# Patient Record
Sex: MALE | Race: WHITE
[De-identification: names, ages, dates, MRNs, and addresses within clinical notes are randomized per-mention and may not be internally consistent; named-entity substitution may affect disease eponyms.]

---

## 2019-10-19 ENCOUNTER — HOSPITAL ENCOUNTER (EMERGENCY)
Dept: HOSPITAL 41 - JD.ED | Age: 34
LOS: 1 days | Discharge: HOME | End: 2019-10-20
Payer: SELF-PAY

## 2019-10-19 DIAGNOSIS — L02.31: Primary | ICD-10-CM

## 2019-10-19 DIAGNOSIS — L03.317: ICD-10-CM

## 2019-10-19 NOTE — EDM.PDOC
ED HPI GENERAL MEDICAL PROBLEM





- General


Chief Complaint: Skin Complaint


Stated Complaint: LUMP ON BUTTOCKS AND FEVER


Time Seen by Provider: 10/19/19 22:07





- History of Present Illness


INITIAL COMMENTS - FREE TEXT/NARRATIVE: 





33-year-old male presents to the emergency room with a lump developing on his 

left Buttocks. He also thought maybe he was getting some fevers.





The patient injects himself every other day with testosterone. A recently 

injected in the site. He is starting to notice some discomfort in this area. 

Other than this is getting along with his activities of daily living without 

any difficulty.. He does not have a local physician.








  ** Left Buttock


Pain Score (Numeric/FACES): 7





- Related Data


 Allergies











Allergy/AdvReac Type Severity Reaction Status Date / Time


 


No Known Allergies Allergy   Verified 10/19/19 22:05











Home Meds: 


 Home Meds





. [No Known Home Meds]  10/19/19 [History]











ED ROS GENERAL





- Review of Systems


Review Of Systems: See Below


Constitutional: Reports: Fever (Possible).  Denies: Chills


Respiratory: Reports: No Symptoms


Cardiovascular: Reports: No Symptoms


GI/Abdominal: Reports: No Symptoms


Skin: Reports: Other (See history of present illness)





ED EXAM, SKIN/RASH


Exam: See Below


Exam Limited By: No Limitations


General Appearance: Alert, No Apparent Distress


Head: Atraumatic, Normocephalic


Neck: Normal Inspection, Supple, Non-Tender, Full Range of Motion


Respiratory/Chest: No Respiratory Distress, Lungs Clear, Normal Breath Sounds, 

No Accessory Muscle Use, Chest Non-Tender


Cardiovascular: Normal Peripheral Pulses, Regular Rate, Rhythm, No Edema, No 

Gallop, No JVD, No Murmur, No Rub


GI/Abdominal: Normal Bowel Sounds, Soft, Non-Tender


Back Exam: Other (Semination left gluteus shows some erythema minimal warmth 

minimal swelling. There is certainly nothing to drain at this point)





Course





- Vital Signs


Last Recorded V/S: 


 Last Vital Signs











Temp  36.8 C   10/19/19 22:02


 


Pulse  89   10/19/19 22:02


 


Resp  18   10/19/19 22:02


 


BP  148/75 H  10/19/19 22:02


 


Pulse Ox  98   10/19/19 22:02














- Orders/Labs/Meds


Labs: 


 Laboratory Tests











  10/19/19 10/19/19 Range/Units





  22:30 22:30 


 


WBC  13.46 H   (4.23-9.07)  K/mm3


 


RBC  5.00   (4.63-6.08)  M/mm3


 


Hgb  15.4   (13.7-17.5)  gm/dl


 


Hct  46.9   (40.1-51.0)  %


 


MCV  93.8 H   (79.0-92.2)  fl


 


MCH  30.8   (25.7-32.2)  pg


 


MCHC  32.8   (32.2-35.5)  g/dl


 


RDW Std Deviation  43.1   (35.1-43.9)  fL


 


Plt Count  252   (163-337)  K/mm3


 


MPV  9.7   (9.4-12.3)  fl


 


Neutrophils % (Manual)  75 H   (40-60)  %


 


Band Neutrophils %  1   (0-10)  %


 


Lymphocytes % (Manual)  20   (20-40)  %


 


Atypical Lymphs %  0   %


 


Monocytes % (Manual)  1 L   (2-10)  %


 


Eosinophils % (Manual)  2   (0.8-7.0)  %


 


Basophils % (Manual)  1   (0.2-1.2)  


 


Platelet Estimate  Adequate   


 


RBC Morph Comment  Normal   


 


C-Reactive Protein   1.5 H*  (<1.0)  mg/dL














- Re-Assessments/Exams


Free Text/Narrative Re-Assessment/Exam: 





10/19/19 23:51


Minimally elevated white count and minimally elevated C-reactive protein. 

Discussed the case with Dr. Rouse will follow up with the patient early this 

next week.





Departure





- Departure


Time of Disposition: 23:51


Disposition: Home, Self-Care 01


Clinical Impression: 


 Abscess, Cellulitis








- Discharge Information


Referrals: 


PCP,None [Primary Care Provider] - 


Bladimir Rouse MD [Physician] - 


Forms:  ED Department Discharge


Additional Instructions: 


Return to the emergency room with any questions problems worsening symptoms. 





Follow-up with Dr. Rouse on Monday or Tuesday. 





In some warm Epsom salts solution soak your bottom for 30 minutes 2-3 times a 

day. From the machine in the waiting room you have been given a prescription 

for clindamycin 300 mg #40 take 1  4 times a day until all gone

## 2020-08-23 ENCOUNTER — HOSPITAL ENCOUNTER (EMERGENCY)
Dept: HOSPITAL 41 - JD.ED | Age: 35
LOS: 1 days | Discharge: HOME | End: 2020-08-24
Payer: SELF-PAY

## 2020-08-23 DIAGNOSIS — I10: ICD-10-CM

## 2020-08-23 DIAGNOSIS — R00.2: ICD-10-CM

## 2020-08-23 DIAGNOSIS — R07.89: Primary | ICD-10-CM

## 2020-08-23 DIAGNOSIS — Z79.899: ICD-10-CM

## 2020-08-23 PROCEDURE — 80053 COMPREHEN METABOLIC PANEL: CPT

## 2020-08-23 PROCEDURE — 96375 TX/PRO/DX INJ NEW DRUG ADDON: CPT

## 2020-08-23 PROCEDURE — 96374 THER/PROPH/DIAG INJ IV PUSH: CPT

## 2020-08-23 PROCEDURE — 85025 COMPLETE CBC W/AUTO DIFF WBC: CPT

## 2020-08-23 PROCEDURE — 99285 EMERGENCY DEPT VISIT HI MDM: CPT

## 2020-08-23 PROCEDURE — 36415 COLL VENOUS BLD VENIPUNCTURE: CPT

## 2020-08-23 PROCEDURE — 93005 ELECTROCARDIOGRAM TRACING: CPT

## 2020-08-23 PROCEDURE — 84484 ASSAY OF TROPONIN QUANT: CPT

## 2020-08-23 NOTE — EDM.PDOC
ED HPI GENERAL MEDICAL PROBLEM





- General


Chief Complaint: Chest Pain


Stated Complaint: CHEST PAIN/HIGH BP/SHAKING


Time Seen by Provider: 08/23/20 23:02


Source of Information: Reports: Patient, RN Notes Reviewed





- History of Present Illness


INITIAL COMMENTS - FREE TEXT/NARRATIVE: 





34 yr old male with palpiations, dizziness, Htn at home, now feeling somewhat 

better but still having palpitations on arrival to ED.  No Gonzalez, chest pain at 

time of eval.  No cough, dyspnea, fever or chills.  No abd pain, nausea or 

vomiting.  Hx of borderline Htn. 





- Related Data


                                    Allergies











Allergy/AdvReac Type Severity Reaction Status Date / Time


 


No Known Allergies Allergy   Verified 08/23/20 22:15











Home Meds: 


                                    Home Meds





lisinopriL [Lisinopril] 10 mg PO DAILY #30 tablet 08/24/20 [Rx]











Past Medical History


Endocrine/Metabolic History: Reports: Other (See Below)


Other Endocrine/Metabolic History: low testosterone


Hematologic History: Reports: Other (See Below)





- Infectious Disease History


Infectious Disease History: Reports: Chicken Pox





- Past Surgical History


HEENT Surgical History: Reports: Oral Surgery





Social & Family History





- Family History


Family Medical History: Noncontributory





- Tobacco Use


Smoking Status *Q: Never Smoker


Second Hand Smoke Exposure: No





- Caffeine Use


Caffeine Use: Reports: Coffee





- Recreational Drug Use


Recreational Drug Use: No





ED ROS GENERAL





- Review of Systems


Review Of Systems: See Below


Constitutional: Denies: Fever, Chills, Diaphoresis


HEENT: Reports: No Symptoms


Respiratory: Denies: Shortness of Breath, Cough


Cardiovascular: Reports: Lightheadedness, Palpitations.  Denies: Chest Pain


GI/Abdominal: Denies: Abdominal Pain, Nausea, Vomiting


Musculoskeletal: Denies: Shoulder Pain, Arm Pain, Back Pain


Skin: Denies: Rash


Neurological: Reports: Dizziness, Numbness (now gone).  Denies: Headache, 

Trouble Speaking, Difficulty Walking





ED EXAM, GENERAL





- Physical Exam


Exam: See Below


General Appearance: Alert, Anxious, Mild Distress


Eye Exam: Bilateral Eye: PERRL


Throat/Mouth: Normal Inspection, Normal Oropharynx


Head: Atraumatic


Neck: Supple


Respiratory/Chest: No Respiratory Distress, Lungs Clear, Normal Breath Sounds


Cardiovascular: Tachycardia


GI/Abdominal: Soft, Non-Tender


Extremities: Normal Inspection, Normal Range of Motion


Neurological: Alert, Oriented, No Motor/Sensory Deficits


Skin Exam: Warm, Dry, No Rash





EKG INTERPRETATION


EKG Date: 08/23/20


Rhythm: Other (sinus tachycardia)


Rate (Beats/Min): 106


Axis: Normal


P-Wave: Present


QRS: Normal


ST-T: Normal





Course





- Vital Signs


Last Recorded V/S: 


                                Last Vital Signs











Temp  97.6 F   08/23/20 22:12


 


Pulse  118 H  08/23/20 22:12


 


Resp  16   08/23/20 22:12


 


BP  160/96 H  08/24/20 00:38


 


Pulse Ox  97   08/23/20 22:12














- Orders/Labs/Meds


Orders: 


                               Active Orders 24 hr











 Category Date Time Status


 


 Peripheral IV Insertion Adult [OM.PC] Stat Oth  08/23/20 23:12 Ordered











Labs: 


                                Laboratory Tests











  08/23/20 08/23/20 Range/Units





  22:18 22:18 


 


WBC  9.46 H   (4.23-9.07)  K/mm3


 


RBC  5.48   (4.63-6.08)  M/mm3


 


Hgb  17.4  D   (13.7-17.5)  gm/dl


 


Hct  51.3 H   (40.1-51.0)  %


 


MCV  93.6 H   (79.0-92.2)  fl


 


MCH  31.8   (25.7-32.2)  pg


 


MCHC  33.9   (32.2-35.5)  g/dl


 


RDW Std Deviation  42.7   (35.1-43.9)  fL


 


Plt Count  302   (163-337)  K/mm3


 


MPV  10.4   (9.4-12.3)  fl


 


Neut % (Auto)  48.2   (34.0-67.9)  %


 


Lymph % (Auto)  39.9   (21.8-53.1)  %


 


Mono % (Auto)  9.6   (5.3-12.2)  %


 


Eos % (Auto)  1.2   (0.8-7.0)  


 


Baso % (Auto)  1.0   (0.1-1.2)  %


 


Neut # (Auto)  4.57   (1.78-5.38)  K/mm3


 


Lymph # (Auto)  3.77 H   (1.32-3.57)  K/mm3


 


Mono # (Auto)  0.91 H   (0.30-0.82)  K/mm3


 


Eos # (Auto)  0.11   (0.04-0.54)  K/mm3


 


Baso # (Auto)  0.09 H   (0.01-0.08)  K/mm3


 


Sodium   141  (136-145)  mEq/L


 


Potassium   3.4 L  (3.5-5.1)  mEq/L


 


Chloride   103  ()  mEq/L


 


Carbon Dioxide   27  (21-32)  mEq/L


 


Anion Gap   14.4  (5-15)  


 


BUN   20 H  (7-18)  mg/dL


 


Creatinine   1.2  (0.7-1.3)  mg/dL


 


Est Cr Clr Drug Dosing   91.25  mL/min


 


Estimated GFR (MDRD)   > 60  (>60)  mL/min


 


BUN/Creatinine Ratio   16.7  (14-18)  


 


Glucose   118 H  ()  mg/dL


 


Calcium   9.2  (8.5-10.1)  mg/dL


 


Total Bilirubin   0.3  (0.2-1.0)  mg/dL


 


AST   28  (15-37)  U/L


 


ALT   41  (16-63)  U/L


 


Alkaline Phosphatase   48  ()  U/L


 


Troponin I   < 0.017  (0.00-0.056)  ng/mL


 


Total Protein   8.0  (6.4-8.2)  g/dl


 


Albumin   4.5  (3.4-5.0)  g/dl


 


Globulin   3.5  gm/dL


 


Albumin/Globulin Ratio   1.3  (1-2)  











Meds: 


Medications














Discontinued Medications














Generic Name Dose Route Start Last Admin





  Trade Name Freq  PRN Reason Stop Dose Admin


 


Acetaminophen  975 mg  08/24/20 00:34  08/24/20 00:37





  Tylenol  PO  08/24/20 00:35  Not Given





  NOW ONE  


 


Al Hydroxide/Mg Hydroxide 30  0 ml  08/23/20 23:41 





  ml/ Lidocaine HCl 15 ml  PO  08/23/20 23:42 





  ONETIME ONE  


 


Al Hydroxide/Mg Hydroxide 30  0 ml  08/23/20 23:42  08/23/20 23:47





  ml/ Lidocaine HCl 15 ml  PO  08/23/20 23:43  45 ml





  ONETIME ONE   Administration


 


Labetalol HCl  20 mg  08/23/20 23:13  08/23/20 23:22





  Normodyne  IVPUSH  08/23/20 23:14  20 mg





  ONETIME ONE   Administration





  Protocol  


 


Lisinopril  10 mg  08/24/20 00:30  08/24/20 00:38





  Prinivil  PO   10 mg





  DAILY KADEEM   Administration


 


Lorazepam  1 mg  08/23/20 23:13  08/23/20 23:26





  Ativan  IVPUSH  08/23/20 23:14  1 mg





  ONETIME ONE   Administration


 


Sodium Chloride  10 ml  08/23/20 23:13  08/23/20 23:28





  Saline Flush  FLUSH   10 ml





  ASDIRECTED PRN   Administration





  Keep Vein Open  














- Re-Assessments/Exams


Free Text/Narrative Re-Assessment/Exam: 





08/24/20 02:17


BP and heart rate came down with labetalol 20 mg IV, ativan 1 mg IV,  will start

 on lisinopril 10 mg daily.  discharge instr. as documented. 





Departure





- Departure


Time of Disposition: 00:35


Disposition: Home, Self-Care 01


Condition: Fair


Clinical Impression: 


 Hypertension, Atypical chest pain, Palpitations





Prescriptions: 


lisinopriL [Lisinopril] 10 mg PO DAILY #30 tablet


Instructions:  Nonspecific Chest Pain, Adult, Hypertension, Adult


Referrals: 


PCP,None [Primary Care Provider] - 


Forms:  ED Department Discharge


Additional Instructions: 


Lisinopril 10 mg daily. You have been given your first dose here in the ED.  

Prescription has been sent to [facility] [place]. Continue to check BP

frequently.  See a medical provider in the next 1 to 2 weeks for a complete 

physical.  Return to ED as needed. 





Sepsis Event Note (ED)





- Evaluation


Sepsis Screening Result: No Definite Risk





- Focused Exam


Vital Signs: 


                                   Vital Signs











  Temp Pulse Resp BP Pulse Ox


 


 08/24/20 00:38     160/96 H 


 


 08/23/20 22:12  97.6 F  118 H  16   97














- My Orders


Last 24 Hours: 


My Active Orders





08/23/20 23:12


Peripheral IV Insertion Adult [OM.PC] Stat 














- Assessment/Plan


Last 24 Hours: 


My Active Orders





08/23/20 23:12


Peripheral IV Insertion Adult [OM.PC] Stat

## 2021-06-17 ENCOUNTER — HOSPITAL ENCOUNTER (EMERGENCY)
Dept: HOSPITAL 41 - JD.ED | Age: 36
LOS: 1 days | Discharge: HOME | End: 2021-06-18
Payer: COMMERCIAL

## 2021-06-17 DIAGNOSIS — Z20.822: ICD-10-CM

## 2021-06-17 DIAGNOSIS — E66.9: ICD-10-CM

## 2021-06-17 DIAGNOSIS — Z72.0: ICD-10-CM

## 2021-06-17 DIAGNOSIS — J06.9: Primary | ICD-10-CM

## 2021-06-17 PROCEDURE — 85007 BL SMEAR W/DIFF WBC COUNT: CPT

## 2021-06-17 PROCEDURE — 99283 EMERGENCY DEPT VISIT LOW MDM: CPT

## 2021-06-17 PROCEDURE — 36415 COLL VENOUS BLD VENIPUNCTURE: CPT

## 2021-06-17 PROCEDURE — 83735 ASSAY OF MAGNESIUM: CPT

## 2021-06-17 PROCEDURE — 85027 COMPLETE CBC AUTOMATED: CPT

## 2021-06-17 PROCEDURE — 0240U: CPT

## 2021-06-17 PROCEDURE — 71046 X-RAY EXAM CHEST 2 VIEWS: CPT

## 2021-06-17 PROCEDURE — 80053 COMPREHEN METABOLIC PANEL: CPT

## 2021-06-17 NOTE — EDM.PDOC
ED HPI GENERAL MEDICAL PROBLEM





- General


Chief Complaint: Respiratory Problem


Stated Complaint: COUGH AND FEVER


Time Seen by Provider: 06/17/21 23:01


Source of Information: Reports: Patient


History Limitations: Reports: No Limitations





- History of Present Illness


INITIAL COMMENTS - FREE TEXT/NARRATIVE: 





Mr. Carey is a very pleasant 35-year-old gentleman who now presents the ED 

stating that he developed a nonproductive cough this past Monday, 6/14/2021, 

followed by a fever, with a T-max of 101.6 degrees, along with chills, chest 

tightness, body aches, dyspnea, and diaphoresis today.  No wheezing.  His 

symptoms are not positional.  He denies associated nasal congestion, rhinorrhea,

or a sore throat.  No associated nausea, vomiting, constipation, or diarrhea.  

He states that he has been taking an over-the-counter cold and sinus medicine, 

along with cough drops, which have not helped.  No similarly ill contacts.





Here in the ED, the patient's initial BP is found to be elevated 166/92, 

otherwise, he is hemodynamically stable, afebrile, saturating 96% on room air.  

He appears to be fatigued, but in no acute distress.





Prior to Monday, the patient denies having a recent fever, chills, sore throat, 

ear pain, nasal or sinus congestion, cough, dyspnea, chest pain, palpitations, 

nausea, vomiting, constipation, diarrhea, abdominal pain, urinary symptoms, 

recent weight gain or weight loss, recent bloody bowel movements or black bowel 

movements, recent joint aches, headaches, or rashes.





The patient has not received a COVID vaccination.








The patient does not have a PCP.











- Related Data


                                    Allergies











Allergy/AdvReac Type Severity Reaction Status Date / Time


 


No Known Allergies Allergy   Verified 06/17/21 21:30











Home Meds: 


                                    Home Meds





Codeine/Promethazine [Phenergan with Codeine] 5 ml PO Q4HR PRN #120 ml 06/18/21 

[Rx]











Past Medical History


Endocrine/Metabolic History: Reports: Obesity/BMI 30+





- Infectious Disease History


Infectious Disease History: Reports: Chicken Pox





- Past Surgical History


HEENT Surgical History: Reports: Oral Surgery (dental extractions)





Social & Family History





- Tobacco Use


Tobacco Use Within Last Twelve Months: Smokeless Tobacco (Chews 1 can/day)





- Caffeine Use


Caffeine Use: Reports: Coffee





- Alcohol Use


Alcohol Use History: Yes


Alcohol Use Frequency: Socially





- Recreational Drug Use


Recreational Drug Use: No





- Living Situation & Occupation


Living situation: Reports:  (), Alone


Occupation: Employed ()





ED ROS GENERAL





- Review of Systems


Review Of Systems: Comprehensive ROS is negative, except as noted in HPI.





ED EXAM, GENERAL





- Physical Exam


Exam: See Below


Exam Limited By: No Limitations


General Appearance: Alert, WD/WN, No Apparent Distress


Eye Exam: Bilateral Eye: EOMI, Normal Inspection


Ears: Normal External Exam, Hearing Grossly Normal


Nose: Normal Inspection


Throat/Mouth: Normal Inspection, Normal Lips, Normal Voice, No Airway Compromise


Head: Atraumatic, Normocephalic


Neck: Normal Inspection, Full Range of Motion


Respiratory/Chest: No Respiratory Distress, Lungs Clear, Normal Breath Sounds, 

No Accessory Muscle Use, Other (Taking a deep breath induces a cough).  No: 

Decreased Breath Sounds, Crackles, Rhonchi, Wheezing, Stridor, Prolonged 

Expiration


Cardiovascular: Normal Peripheral Pulses, Regular Rate, Rhythm, No Edema, No 

Gallop, No JVD, No Murmur, No Rub


Peripheral Pulses: 3+: Radial (L), Radial (R)


GI/Abdominal: Normal Bowel Sounds, Soft, Non-Tender, No Organomegaly, No Disten

tion, No Abnormal Bruit, No Mass


Back Exam: Normal Inspection, Full Range of Motion, NT


Extremities: Normal Inspection, Normal Range of Motion, No Pedal Edema, Normal 

Capillary Refill


Neurological: Alert, Oriented, Normal Cognition, No Motor/Sensory Deficits


Psychiatric: Normal Affect


Skin Exam: Warm, Dry, Intact, Normal Color, No Rash





Course





- Vital Signs


Last Recorded V/S: 


                                Last Vital Signs











Temp  37.6 C   06/17/21 21:30


 


Pulse  96   06/17/21 21:30


 


Resp  15   06/17/21 21:30


 


BP  166/92 H  06/17/21 21:30


 


Pulse Ox  96   06/17/21 21:30














- Orders/Labs/Meds


Labs: 


                                Laboratory Tests











  06/17/21 06/17/21 06/17/21 Range/Units





  23:00 23:35 23:35 


 


WBC   6.43   (4.23-9.07)  K/mm3


 


RBC   5.31   (4.63-6.08)  M/mm3


 


Hgb   17.1   (13.7-17.5)  gm/dl


 


Hct   49.8   (40.1-51.0)  %


 


MCV   93.8 H   (79.0-92.2)  fl


 


MCH   32.2   (25.7-32.2)  pg


 


MCHC   34.3   (32.2-35.5)  g/dl


 


RDW Std Deviation   43.0   (35.1-43.9)  fL


 


Plt Count   206  D   (163-337)  K/mm3


 


MPV   10.0   (9.4-12.3)  fl


 


Neutrophils % (Manual)   58   (40-60)  %


 


Band Neutrophils %   0   (0-10)  %


 


Lymphocytes % (Manual)   28   (20-40)  %


 


Atypical Lymphs %   0   %


 


Monocytes % (Manual)   10   (2-10)  %


 


Eosinophils % (Manual)   3   (0.8-7.0)  %


 


Basophils % (Manual)   1   (0.2-1.2)  


 


Platelet Estimate   Adequate   


 


RBC Morph Comment   Normal   


 


Sodium    138  (136-145)  mEq/L


 


Potassium    3.7  (3.5-5.1)  mEq/L


 


Chloride    103  ()  mEq/L


 


Carbon Dioxide    23  (21-32)  mEq/L


 


Anion Gap    15.7 H  (5-15)  


 


BUN    15  (7-18)  mg/dL


 


Creatinine    1.2  (0.7-1.3)  mg/dL


 


Est Cr Clr Drug Dosing    90.41  mL/min


 


Estimated GFR (MDRD)    > 60  (>60)  mL/min


 


BUN/Creatinine Ratio    12.5 L  (14-18)  


 


Glucose    127 H  (70-99)  mg/dL


 


Calcium    8.4 L  (8.5-10.1)  mg/dL


 


Magnesium    1.8  (1.8-2.4)  mg/dL


 


Total Bilirubin    0.1 L  (0.2-1.0)  mg/dL


 


AST    27  (15-37)  U/L


 


ALT    45  (16-63)  U/L


 


Alkaline Phosphatase    51  ()  U/L


 


Total Protein    7.3  (6.4-8.2)  g/dl


 


Albumin    3.9  (3.4-5.0)  g/dl


 


Globulin    3.4  gm/dL


 


Albumin/Globulin Ratio    1.2  (1-2)  


 


Influenza Type A RNA  Negative    (NEGATIVE)  


 


Influenza Type B RNA  Negative    (NEGATIVE)  


 


SARS-CoV-2 RNA (NADEEM)  Negative    (NEGATIVE)  














- Re-Assessments/Exams


Free Text/Narrative Re-Assessment/Exam: 





06/17/21 23:23


As above, the patient has had a nonproductive cough since Monday, with, dyspnea,

 chills, a fever up to 101.6 degrees, diaphoresis, and body aches today.  An 

over-the-counter cold and sinus medicine and cough drops have not helped.  Here 

in the ED, the patient is afebrile, saturating 96% on room air.  Taking deep 

breaths induces a cough, however, his lungs are entirely clear to auscultation 

bilaterally, with the remainder of his physical exam being unremarkable.  I have

 ordered a work-up that includes several blood tests, a chest x-ray, and a swab 

for the SARS-CoV-2 virus/influenza A + B viruses.








06/18/21 00:28


Two-view chest radiograph appears to be grossly normal.  The cardiac silhouette 

is within normal limits.  No pulmonary vascular congestion.  No pleural 

effusions.  No focal infiltrate.  No pneumothorax.  Formal read per the 

Radiologist pending.





The patient's CBC is unremarkable.


His CMP is remarkable for an anion gap slightly elevated at 15.3, but with a 

bicarbonate normal at 23, and mild hyperglycemia of 127, with the remainder of 

his CMP being unremarkable.


His magnesium level is within normal limits at 1.8.


His swab for the SARS-CoV-2 virus/influenza A + B viruses is negative for all.








06/18/21 00:33


Test results discussed with the patient.  As above, today's work-up is 

completely unremarkable.  He does not have pneumonia.  His cough is most likely 

due to a viral URI.  I will discharge her home with a prescription for 

codeine/promethazine that he can fill in the morning.











Departure





- Departure


Time of Disposition: 00:35


Disposition: Home, Self-Care 01


Condition: Good


Clinical Impression: 


 Viral URI with cough








- Discharge Information


*PRESCRIPTION DRUG MONITORING PROGRAM REVIEWED*: Not Applicable


*COPY OF PRESCRIPTION DRUG MONITORING REPORT IN PATIENT CATALINA: Not Applicable


Prescriptions: 


Codeine/Promethazine [Phenergan with Codeine] 5 ml PO Q4HR PRN #120 ml


 PRN Reason: Cough


Instructions:  Viral Respiratory Infection


Referrals: 


PCP,None [Primary Care Provider] - 


Forms:  ED Department Discharge


Additional Instructions: 


You were seen in the emergency room for 3 days of a dry cough with fever, 

chills, body aches, and shortness of breath.





Work-up in the ER included several blood tests, a swab for the SARS-CoV-2 

virus/influenza A + B viruses, and a chest x-ray.





Your entire work-up was unremarkable.  You do not have pneumonia.  Your COVID 

test was negative.





Based on your history, physical exam, and ER tests, you are most likely 

suffering from a viral URI.





Unfortunately, there are no medicines to get rid of a viral URI - it will have 

to run its course.





You have been provided with a prescription for the opioid cough syrup codeine 

with promethazine.  You may take 5 mL of the cough syrup up to every 4 hours, 

however, you are not to drive or operate heavy machinery for 12 hours after 

taking it, therefore, if you are working, you should limit your intake of this 

medicine to before bed.





Be aware that codeine may cause constipation, so consider taking a stool soften

er.





If any other problems, please do not hesitate to return to the ER.





Sepsis Event Note (ED)





- Evaluation


Sepsis Screening Result: No Definite Risk





- Focused Exam


Vital Signs: 


                                   Vital Signs











  Temp Pulse Resp BP Pulse Ox


 


 06/17/21 21:30  37.6 C  96  15  166/92 H  96

## 2021-06-18 LAB — SARS-COV-2 RNA RESP QL NAA+PROBE: NEGATIVE

## 2021-06-18 NOTE — CR
Chest: 2 views of the chest were obtained.

 

Comparison: No prior chest imaging is available.

 

Heart size and mediastinum are within normal limits.  Lungs are clear 

with no acute parenchymal change.  No acute osseous abnormality is 

appreciated.

 

Impression:

1.  Nothing acute is appreciated on 2 view chest x-ray.

 

Diagnostic code #1

## 2021-08-22 ENCOUNTER — HOSPITAL ENCOUNTER (EMERGENCY)
Dept: HOSPITAL 41 - JD.ED | Age: 36
Discharge: HOME | End: 2021-08-22
Payer: COMMERCIAL

## 2021-08-22 DIAGNOSIS — U07.1: Primary | ICD-10-CM

## 2021-08-22 DIAGNOSIS — E66.9: ICD-10-CM

## 2021-08-22 DIAGNOSIS — I10: ICD-10-CM

## 2021-08-22 DIAGNOSIS — Z72.0: ICD-10-CM

## 2021-08-22 PROCEDURE — 93005 ELECTROCARDIOGRAM TRACING: CPT

## 2021-08-22 PROCEDURE — 71275 CT ANGIOGRAPHY CHEST: CPT

## 2021-08-22 PROCEDURE — 96372 THER/PROPH/DIAG INJ SC/IM: CPT

## 2021-08-22 PROCEDURE — 85025 COMPLETE CBC W/AUTO DIFF WBC: CPT

## 2021-08-22 PROCEDURE — 87635 SARS-COV-2 COVID-19 AMP PRB: CPT

## 2021-08-22 PROCEDURE — 36415 COLL VENOUS BLD VENIPUNCTURE: CPT

## 2021-08-22 PROCEDURE — 82728 ASSAY OF FERRITIN: CPT

## 2021-08-22 PROCEDURE — 83735 ASSAY OF MAGNESIUM: CPT

## 2021-08-22 PROCEDURE — 85379 FIBRIN DEGRADATION QUANT: CPT

## 2021-08-22 PROCEDURE — 84484 ASSAY OF TROPONIN QUANT: CPT

## 2021-08-22 PROCEDURE — 94667 MNPJ CHEST WALL 1ST: CPT

## 2021-08-22 PROCEDURE — U0002 COVID-19 LAB TEST NON-CDC: HCPCS

## 2021-08-22 PROCEDURE — 71045 X-RAY EXAM CHEST 1 VIEW: CPT

## 2021-08-22 PROCEDURE — 80053 COMPREHEN METABOLIC PANEL: CPT

## 2021-08-22 PROCEDURE — 86140 C-REACTIVE PROTEIN: CPT

## 2021-08-22 PROCEDURE — 99284 EMERGENCY DEPT VISIT MOD MDM: CPT

## 2021-08-22 PROCEDURE — 83615 LACTATE (LD) (LDH) ENZYME: CPT

## 2021-08-22 NOTE — PCM.EKG
** #1 Interpretation


EKG Date: 08/22/21


Time: 11:45


Rhythm: NSR


Rate (Beats/Min): 83


Axis: Normal


P-Wave: Present


QRS: Normal


ST-T: Normal


QT: Normal


EKG Interpretation Comments: 





Per Dr. Harris interpretation: Sinus rhythm at 83 bpm; borderline right axis 

deviation; borderline T abnormalities, inferior leads; ST elevation, probable 

normal early repolarization pattern; T wave depressed in lead IIInear 

isoelectric in aVF

## 2021-08-22 NOTE — CR
Chest: Portable view of the chest was obtained.

 

Comparison: Prior chest x-ray of 06/17/21.

 

Focal density is seen within the inferior right hilar region.  This 

presumably represents a small area of pneumonia as this is an interval

 change from previous study.  Heart is within normal limits.  Lungs 

otherwise are clear.  Bony structures show nothing acute.

 

Impression:

1.  Focal density within the inferior right hilar region as an 

interval change from prior chest x-ray.  This most likely represents a

 small area of pneumonia.

2.  No other acute abnormality is seen.

 

Diagnostic code #3

## 2021-08-22 NOTE — EDM.PDOC
ED HPI GENERAL MEDICAL PROBLEM





- General


Chief Complaint: Respiratory Problem


Stated Complaint: COVID SX


Time Seen by Provider: 08/22/21 11:11


Source of Information: Reports: Patient


History Limitations: Reports: No Limitations, Other (ED vital signs reveal a 

temp of 99.4, pulse of 82, respiratory rate of 20, blood pressure 134/92, pulse 

ox 94% on room air)





- History of Present Illness


INITIAL COMMENTS - FREE TEXT/NARRATIVE: 





35-year-old male presents the emergency department today with a 4-day history of

fever, chills, nausea, vomiting, diarrhea, cough, shortness of breath and 

generalized body aches.  He also states his appetite has been poor and he has 

not taken much in the way of food or fluids.  States he did try to take some 

ibuprofen for the body aches and fever however this is not helped much.  He has 

not had Covid nor has he had his Covid vaccine.  He states he is otherwise 

healthy.  He does not smoke, he denies recreational drug use, and he drinks 

socially.


  ** Generalized


Pain Score (Numeric/FACES): 7





- Related Data


                                    Allergies











Allergy/AdvReac Type Severity Reaction Status Date / Time


 


No Known Allergies Allergy   Verified 08/22/21 10:37











Home Meds: 


                                    Home Meds





Benzonatate [Tessalon Perle] 100 mg PO TID PRN #24 capsule 08/22/21 [Rx]


Ondansetron [Zofran ODT] 4 mg PO Q6H PRN #12 tab.dis 08/22/21 [Rx]











Past Medical History





- Past Health History


Medical/Surgical History: Denies Medical/Surgical History


Cardiovascular History: Reports: Hypertension


Endocrine/Metabolic History: Reports: Obesity/BMI 30+


Other Endocrine/Metabolic History: low testosterone


Hematologic History: Reports: Other (See Below)





- Infectious Disease History


Infectious Disease History: Reports: Chicken Pox





- Past Surgical History


HEENT Surgical History: Reports: Oral Surgery





Social & Family History





- Family History


Family Medical History: No Pertinent Family History





- Tobacco Use


Tobacco Use Status *Q: Current Every Day Tobacco User


Years of Tobacco use: 15


Packs/Tins Daily: 1





- Caffeine Use


Caffeine Use: Reports: Coffee, Energy Drinks, Soda





- Alcohol Use


Days Per Week of Alcohol Use: 2


Number of Drinks Per Day: 2


Total Drinks Per Week: 4





- Recreational Drug Use


Recreational Drug Use: No





- Living Situation & Occupation


Living situation: Reports:  (), Alone


Occupation: Employed ()





ED ROS GENERAL





- Review of Systems


Review Of Systems: Comprehensive ROS is negative, except as noted in HPI.





ED EXAM, GENERAL





- Physical Exam


Exam: See Below


Exam Limited By: No Limitations


General Appearance: Alert, WD/WN, Mild Distress


Eye Exam: Bilateral Eye: PERRL


Ears: Normal External Exam, Hearing Grossly Normal


Nose: Normal Inspection


Throat/Mouth: Normal Inspection, Normal Lips, Normal Voice, No Airway Compromise


Head: Atraumatic


Neck: Normal Inspection, Supple


Respiratory/Chest: No Respiratory Distress, Normal Breath Sounds, No Accessory 

Muscle Use, Chest Non-Tender.  No: Lungs Clear (Diminished throughout)


Cardiovascular: Normal Peripheral Pulses, Regular Rate, Rhythm, No Edema, No 

Murmur


Peripheral Pulses: 2+: Radial (L), Radial (R)


GI/Abdominal: Normal Bowel Sounds, Soft, Non-Tender, No Distention


 (Male) Exam: Deferred


Rectal (Males) Exam: Deferred


Back Exam: Normal Inspection


Extremities: Normal Inspection, Normal Range of Motion, No Pedal Edema, Normal 

Capillary Refill


Neurological: Alert, Oriented, Normal Cognition


Psychiatric: Normal Affect


Skin Exam: Warm, Dry, Intact, Normal Color, No Rash


Lymphatic: No Adenopathy





Course





- Vital Signs


Text/Narrative:: 





As stated above patient with 4-day history of Covid type symptoms.  Patient O2 

saturations are 94 to 100% on room air however he does verbally state that he is

 short of breath with any exertion.  Upon assessment when I do have the patient 

take a deep breath, it does produce him to cough persistently.  I have ordered a

 Covid swab, labs to include a CBC, CMP, magnesium, C-reactive protein, D-dimer,

 LDH, and ferritin levels, will obtain a portable view of the chest.  I have 

ordered for him to receive some Zofran ODT and 60 mg of Toradol IM for the body 

aches.


Last Recorded V/S: 


                                Last Vital Signs











Temp  99.4 F   08/22/21 10:36


 


Pulse  82   08/22/21 10:36


 


Resp  20   08/22/21 10:36


 


BP  134/92 H  08/22/21 10:36


 


Pulse Ox  94 L  08/22/21 10:36














- Orders/Labs/Meds


Orders: 


                               Active Orders 24 hr











 Category Date Time Status


 


 Sodium Chloride 0.9% [Normal Saline] 45 ml Med  08/22/21 12:45 Active





 IV ASDIRECTED   


 


 Sodium Chloride 0.9% [Saline Flush] Med  08/22/21 12:32 Active





 10 ml FLUSH ONETIME PRN   


 


 Isolation [COMM] Routine Oth  08/22/21 11:58 Ordered








                                Medication Orders





Sodium Chloride (Normal Saline)  45 mls @ 40 mls/hr IV ASDIRECTED KADEEM


   Last Admin: 08/22/21 13:22  Dose: 40 mls/hr


   Documented by: VAISHNAVI


Sodium Chloride (Sodium Chloride 0.9% 10 Ml Syringe)  10 ml FLUSH ONETIME PRN


   PRN Reason: Keep Vein Open


   Last Admin: 08/22/21 13:21  Dose: 10 ml


   Documented by: VAISHNAVI








Labs: 


                                Laboratory Tests











  08/22/21 08/22/21 08/22/21 Range/Units





  10:30 11:45 11:45 


 


WBC   5.18   (4.23-9.07)  K/mm3


 


RBC   5.30   (4.63-6.08)  M/mm3


 


Hgb   16.8   (13.7-17.5)  gm/dl


 


Hct   50.0   (40.1-51.0)  %


 


MCV   94.3 H   (79.0-92.2)  fl


 


MCH   31.7   (25.7-32.2)  pg


 


MCHC   33.6   (32.2-35.5)  g/dl


 


RDW Std Deviation   42.6   (35.1-43.9)  fL


 


Plt Count   152 L   (163-337)  K/mm3


 


MPV   10.3   (9.4-12.3)  fl


 


Neut % (Auto)   69.3 H   (34.0-67.9)  %


 


Lymph % (Auto)   23.2   (21.8-53.1)  %


 


Mono % (Auto)   7.3   (5.3-12.2)  %


 


Eos % (Auto)   0 L   (0.8-7.0)  


 


Baso % (Auto)   0.2   (0.1-1.2)  %


 


Neut # (Auto)   3.59   (1.78-5.38)  K/mm3


 


Lymph # (Auto)   1.20 L   (1.32-3.57)  K/mm3


 


Mono # (Auto)   0.38   (0.30-0.82)  K/mm3


 


Eos # (Auto)   0.00 L   (0.04-0.54)  K/mm3


 


Baso # (Auto)   0.01   (0.01-0.08)  K/mm3


 


D-Dimer, Quantitative    0.60 H  (0.19-0.50)  mg/L


 


Sodium     (136-145)  mEq/L


 


Potassium     (3.5-5.1)  mEq/L


 


Chloride     ()  mEq/L


 


Carbon Dioxide     (21-32)  mEq/L


 


Anion Gap     (5-15)  


 


BUN     (7-18)  mg/dL


 


Creatinine     (0.7-1.3)  mg/dL


 


Est Cr Clr Drug Dosing     mL/min


 


Estimated GFR (MDRD)     (>60)  mL/min


 


BUN/Creatinine Ratio     (14-18)  


 


Glucose     (70-99)  mg/dL


 


Calcium     (8.5-10.1)  mg/dL


 


Magnesium     (1.8-2.4)  mg/dL


 


Ferritin     ()  ng/ml


 


Total Bilirubin     (0.2-1.0)  mg/dL


 


AST     (15-37)  U/L


 


ALT     (16-63)  U/L


 


Alkaline Phosphatase     ()  U/L


 


Lactate Dehydrogenase     ()  U/L


 


Troponin I     (0.00-0.056)  ng/mL


 


C-Reactive Protein     (<1.0)  mg/dL


 


Total Protein     (6.4-8.2)  g/dl


 


Albumin     (3.4-5.0)  g/dl


 


Globulin     gm/dL


 


Albumin/Globulin Ratio     (1-2)  


 


SARS-CoV-2 RNA (NADEEM)  Positive H    (NEGATIVE)  














  08/22/21 08/22/21 Range/Units





  11:45 11:45 


 


WBC    (4.23-9.07)  K/mm3


 


RBC    (4.63-6.08)  M/mm3


 


Hgb    (13.7-17.5)  gm/dl


 


Hct    (40.1-51.0)  %


 


MCV    (79.0-92.2)  fl


 


MCH    (25.7-32.2)  pg


 


MCHC    (32.2-35.5)  g/dl


 


RDW Std Deviation    (35.1-43.9)  fL


 


Plt Count    (163-337)  K/mm3


 


MPV    (9.4-12.3)  fl


 


Neut % (Auto)    (34.0-67.9)  %


 


Lymph % (Auto)    (21.8-53.1)  %


 


Mono % (Auto)    (5.3-12.2)  %


 


Eos % (Auto)    (0.8-7.0)  


 


Baso % (Auto)    (0.1-1.2)  %


 


Neut # (Auto)    (1.78-5.38)  K/mm3


 


Lymph # (Auto)    (1.32-3.57)  K/mm3


 


Mono # (Auto)    (0.30-0.82)  K/mm3


 


Eos # (Auto)    (0.04-0.54)  K/mm3


 


Baso # (Auto)    (0.01-0.08)  K/mm3


 


D-Dimer, Quantitative    (0.19-0.50)  mg/L


 


Sodium  138   (136-145)  mEq/L


 


Potassium  4.3   (3.5-5.1)  mEq/L


 


Chloride  103   ()  mEq/L


 


Carbon Dioxide  29   (21-32)  mEq/L


 


Anion Gap  10.3   (5-15)  


 


BUN  14   (7-18)  mg/dL


 


Creatinine  1.2   (0.7-1.3)  mg/dL


 


Est Cr Clr Drug Dosing  90.41   mL/min


 


Estimated GFR (MDRD)  > 60   (>60)  mL/min


 


BUN/Creatinine Ratio  11.7 L   (14-18)  


 


Glucose  97   (70-99)  mg/dL


 


Calcium  8.0 L   (8.5-10.1)  mg/dL


 


Magnesium  1.8   (1.8-2.4)  mg/dL


 


Ferritin   731 H  ()  ng/ml


 


Total Bilirubin  0.3   (0.2-1.0)  mg/dL


 


AST  44 H   (15-37)  U/L


 


ALT  51   (16-63)  U/L


 


Alkaline Phosphatase  34 L   ()  U/L


 


Lactate Dehydrogenase  322 H   ()  U/L


 


Troponin I  < 0.017   (0.00-0.056)  ng/mL


 


C-Reactive Protein  < 0.2   (<1.0)  mg/dL


 


Total Protein  7.0   (6.4-8.2)  g/dl


 


Albumin  3.5   (3.4-5.0)  g/dl


 


Globulin  3.5   gm/dL


 


Albumin/Globulin Ratio  1.0   (1-2)  


 


SARS-CoV-2 RNA (NADEEM)    (NEGATIVE)  











Meds: 


Medications











Generic Name Dose Route Start Last Admin





  Trade Name Carmen  PRN Reason Stop Dose Admin


 


Sodium Chloride  45 mls @ 40 mls/hr  08/22/21 12:45  08/22/21 13:22





  Normal Saline  IV   40 mls/hr





  ASDIRECTED KADEEM   Administration


 


Sodium Chloride  10 ml  08/22/21 12:32  08/22/21 13:21





  Sodium Chloride 0.9% 10 Ml Syringe  FLUSH   10 ml





  ONETIME PRN   Administration





  Keep Vein Open  














Discontinued Medications














Generic Name Dose Route Start Last Admin





  Trade Name Carmen  PRN Reason Stop Dose Admin


 


Iopamidol  100 ml  08/22/21 12:32  08/22/21 13:21





  Iopamidol 755 Mg/Ml 100 Ml Bottle  IVPUSH  08/22/21 12:33  100 ml





  ONETIME ONE   Administration


 


Ketorolac Tromethamine  60 mg  08/22/21 11:27  08/22/21 11:35





  Ketorolac 60 Mg/2 Ml Sdv  IM  08/22/21 11:28  60 mg





  ONETIME ONE   Administration


 


Ondansetron HCl  4 mg  08/22/21 11:26  08/22/21 11:34





  Ondansetron 4 Mg Tab.Dis  PO  08/22/21 11:27  4 mg





  ONETIME ONE   Administration














- Re-Assessments/Exams


Free Text/Narrative Re-Assessment/Exam: 





08/22/21 11:45


D-dimer is elevated at 0.60.  This is likely due to Covid however with the 

patient's respiratory symptoms I have elected to order a CTA of the lungs on 

this patient.








08/22/21 13:03


Hematology reveals a WBC of 5.18, hemoglobin 16.8, hematocrit 50.0, platelet 

count 152





Coagulation reveals a D-dimer of 0.60





Chemistry reveals a sodium of 138, potassium 4.3, carbon dioxide 29, anion gap 

10.3, BUN 14, creatinine 1.2, glucose 97, magnesium 1.8, ferritin 731, total 

bilirubin 0.3, AST 44, ALT 51, alk phos 34, , troponin less than 0.017, 

C-reactive protein less than 0.2





Patient is Covid positive


08/22/21 14:02


Radiologist impression portable view of the chest: 1.  Focal density within the 

inferior right hilar region as interval change from prior chest x-ray.  Most 

likely represents a small area pneumonia.


2.  No other acute abnormality is seen





Radiologist impression CT scan of the chest: 1.  No findings of pulmonary 

embolism.


2.  Areas of pneumonia most prominent within the right lung base compatible with

 Covid etiology.


3.  Slight adenopathy within the right hilum believed to be inflammatory in 

etiology.





Patient will be discharged home with recommendations that he use incentive 

spirometry 10 times while awake, respiratory therapy will instruct patient how 

to use.  Will send a prescription for Zofran 4 mg ODT to his pharmacy for him to

 take every 6 hours as needed for nausea and vomiting.  We will also send a 

prescription for Tessalon Perles for him to take up to 3 times daily for cough. 

 We will also have nursing staff send him home with a pulse oximeter and he will

 be instructed to return the emergency department should his O2 sats drop below 

90% at rest.





Departure





- Departure


Time of Disposition: 14:10


Disposition: Home, Self-Care 01


Condition: Good


Clinical Impression: 


 COVID-19








- Discharge Information


Prescriptions: 


Benzonatate [Tessalon Perle] 100 mg PO TID PRN #24 capsule


 PRN Reason: Cough


Ondansetron [Zofran ODT] 4 mg PO Q6H PRN #12 tab.dis


 PRN Reason: Nausea/Vomiting


Referrals: 


PCP,None [Primary Care Provider] - 


Forms:  ED Department Discharge, ED Return to Work/School Form


Additional Instructions: 


You were seen in the emergency department today with Covid type symptoms.  Covid

testing was completed and you are positive.  Chest x-ray and CT scan of the 

chest were also completed.  You do not have any blood clots in your lungs 

however you do have some pneumonia noted in your right lung, however this is due

to Covid and it is viral so cannot be treated with antibiotics.  I have sent 

prescription to your pharmacy for Zofran ODT 4 mg tab.  You can take 1 of these 

every 6 hours as needed for nausea or vomiting.  Be sure to place the tablet 

under your tongue and allow it to dissolve.  Wait 30 minutes prior to eating or 

drinking after taking the medication to allow to take full effect.  I have also 

sent a prescription for a medication called Tessalon Perles to your pharmacy.  

This medication is used for cough.  You can take 1 tab up to 3 times daily for 

cough.  Use your incentive spirometer and flutter valve as directed by the 

respiratory therapist up to 10 times per hour to keep your lungs expanded.  You 

have been sent home with a pulse oximeter.  Should you have shortness of breath,

check the pulse ox after sitting AT REST FOR AT LEAST 30 MINUTES.  If your 

oxygen level is less than 90% it is recommended that you come back to the 

emergency department to be reevaluated.  Take Tylenol or ibuprofen per label 

directions for headache or generalized body aches.  Drink plenty of fluids and 

get plenty rest.  YOU NEED TO QUARANTINE FOR 10 DAYS TIME.  It is strongly 

recommended that you get your Covid vaccination.





Sepsis Event Note (ED)





- Focused Exam


Vital Signs: 


                                   Vital Signs











  Temp Pulse Resp BP Pulse Ox


 


 08/22/21 10:36  99.4 F  82  20  134/92 H  94 L














- My Orders


Last 24 Hours: 


My Active Orders





08/22/21 11:58


Isolation [COMM] Routine 





08/22/21 12:32


Sodium Chloride 0.9% [Saline Flush]   10 ml FLUSH ONETIME PRN 





08/22/21 12:45


Sodium Chloride 0.9% [Normal Saline] 45 ml IV ASDIRECTED 














- Assessment/Plan


Last 24 Hours: 


My Active Orders





08/22/21 11:58


Isolation [COMM] Routine 





08/22/21 12:32


Sodium Chloride 0.9% [Saline Flush]   10 ml FLUSH ONETIME PRN 





08/22/21 12:45


Sodium Chloride 0.9% [Normal Saline] 45 ml IV ASDIRECTED